# Patient Record
Sex: FEMALE | Race: WHITE | NOT HISPANIC OR LATINO | URBAN - METROPOLITAN AREA
[De-identification: names, ages, dates, MRNs, and addresses within clinical notes are randomized per-mention and may not be internally consistent; named-entity substitution may affect disease eponyms.]

---

## 2021-07-14 ENCOUNTER — OFFICE VISIT (OUTPATIENT)
Dept: URBAN - METROPOLITAN AREA CLINIC 19 | Facility: CLINIC | Age: 76
End: 2021-07-14

## 2022-02-14 ENCOUNTER — LAB OUTSIDE AN ENCOUNTER (OUTPATIENT)
Dept: URBAN - METROPOLITAN AREA CLINIC 19 | Facility: CLINIC | Age: 77
End: 2022-02-14

## 2022-02-14 ENCOUNTER — OFFICE VISIT (OUTPATIENT)
Dept: URBAN - METROPOLITAN AREA CLINIC 19 | Facility: CLINIC | Age: 77
End: 2022-02-14
Payer: MEDICARE

## 2022-02-14 ENCOUNTER — WEB ENCOUNTER (OUTPATIENT)
Dept: URBAN - METROPOLITAN AREA CLINIC 19 | Facility: CLINIC | Age: 77
End: 2022-02-14

## 2022-02-14 ENCOUNTER — DASHBOARD ENCOUNTERS (OUTPATIENT)
Age: 77
End: 2022-02-14

## 2022-02-14 VITALS
OXYGEN SATURATION: 99 % | TEMPERATURE: 98.3 F | SYSTOLIC BLOOD PRESSURE: 126 MMHG | HEART RATE: 81 BPM | BODY MASS INDEX: 23.11 KG/M2 | DIASTOLIC BLOOD PRESSURE: 80 MMHG | HEIGHT: 69 IN | WEIGHT: 156 LBS

## 2022-02-14 DIAGNOSIS — R13.10 DYSPHAGIA, UNSPECIFIED TYPE: ICD-10-CM

## 2022-02-14 DIAGNOSIS — K21.9 GASTROESOPHAGEAL REFLUX DISEASE, UNSPECIFIED WHETHER ESOPHAGITIS PRESENT: ICD-10-CM

## 2022-02-14 PROCEDURE — 99213 OFFICE O/P EST LOW 20 MIN: CPT | Performed by: NURSE PRACTITIONER

## 2022-02-14 NOTE — HPI-TODAY'S VISIT:
Ms. Penrose is a 76 year-old female who was last seen in GI clinic on 8/14/2019 by Dr. Madden for dysphagia. She reports her dysphagia returned a couple of months ago. Was taking omeprazole prn, now been on it every day for the last 6 weeks. Feels she has seen a slight improvement. Some reflux. No nausea. No abdominal pain. She denies cardiac/kidney/lung disease, diabetes, blood thinners, and home O2.   Prior history below:  Colonoscopy with Dr. Madden 7/30/2019- Preparation of colon was fair. One 5mm polyp (tubular adenoma). Diverticulosis in the sigmoid. Non-bleeding internal hemorrhoids.  EGD with Dr. Madden 7/30/2019- Nodular mucosa in the esophagus. Medium-sized hiatal hernia. Gastritis. Normal examined duodenum.

## 2022-04-05 PROBLEM — 235595009: Status: ACTIVE | Noted: 2022-02-14

## 2022-04-19 ENCOUNTER — TELEPHONE ENCOUNTER (OUTPATIENT)
Dept: URBAN - METROPOLITAN AREA CLINIC 92 | Facility: CLINIC | Age: 77
End: 2022-04-19

## 2022-04-19 ENCOUNTER — OFFICE VISIT (OUTPATIENT)
Dept: URBAN - METROPOLITAN AREA LAB 2 | Facility: LAB | Age: 77
End: 2022-04-19

## 2022-04-19 ENCOUNTER — CLAIMS CREATED FROM THE CLAIM WINDOW (OUTPATIENT)
Dept: URBAN - METROPOLITAN AREA LAB 2 | Facility: LAB | Age: 77
End: 2022-04-19

## 2022-04-19 ENCOUNTER — CLAIMS CREATED FROM THE CLAIM WINDOW (OUTPATIENT)
Dept: URBAN - METROPOLITAN AREA LAB 2 | Facility: LAB | Age: 77
End: 2022-04-19
Payer: MEDICARE

## 2022-04-19 DIAGNOSIS — K21.00 ALKALINE REFLUX ESOPHAGITIS: ICD-10-CM

## 2022-04-19 DIAGNOSIS — K29.60 ADENOPAPILLOMATOSIS GASTRICA: ICD-10-CM

## 2022-04-19 DIAGNOSIS — R13.10 ABNORMAL DEGLUTITION: ICD-10-CM

## 2022-04-19 PROCEDURE — 43239 EGD BIOPSY SINGLE/MULTIPLE: CPT | Performed by: INTERNAL MEDICINE

## 2022-04-28 ENCOUNTER — TELEPHONE ENCOUNTER (OUTPATIENT)
Dept: URBAN - METROPOLITAN AREA CLINIC 92 | Facility: CLINIC | Age: 77
End: 2022-04-28

## 2022-05-26 PROBLEM — 40739000: Status: ACTIVE | Noted: 2022-02-14

## 2022-06-03 ENCOUNTER — TELEPHONE ENCOUNTER (OUTPATIENT)
Dept: URBAN - METROPOLITAN AREA CLINIC 19 | Facility: CLINIC | Age: 77
End: 2022-06-03

## 2022-06-21 ENCOUNTER — OFFICE VISIT (OUTPATIENT)
Dept: URBAN - METROPOLITAN AREA LAB 2 | Facility: LAB | Age: 77
End: 2022-06-21

## 2022-06-28 ENCOUNTER — OFFICE VISIT (OUTPATIENT)
Dept: URBAN - METROPOLITAN AREA LAB 2 | Facility: LAB | Age: 77
End: 2022-06-28

## 2024-08-12 ENCOUNTER — LAB OUTSIDE AN ENCOUNTER (OUTPATIENT)
Dept: URBAN - METROPOLITAN AREA CLINIC 19 | Facility: CLINIC | Age: 79
End: 2024-08-12

## 2024-08-14 ENCOUNTER — OFFICE VISIT (OUTPATIENT)
Dept: URBAN - METROPOLITAN AREA CLINIC 19 | Facility: CLINIC | Age: 79
End: 2024-08-14
Payer: MEDICARE

## 2024-08-14 ENCOUNTER — LAB OUTSIDE AN ENCOUNTER (OUTPATIENT)
Dept: URBAN - METROPOLITAN AREA CLINIC 19 | Facility: CLINIC | Age: 79
End: 2024-08-14

## 2024-08-14 DIAGNOSIS — K22.10 ESOPHAGEAL ULCER: ICD-10-CM

## 2024-08-14 DIAGNOSIS — Z86.010 PERSONAL HISTORY OF COLONIC POLYPS: ICD-10-CM

## 2024-08-14 PROCEDURE — 99203 OFFICE O/P NEW LOW 30 MIN: CPT

## 2024-08-14 PROCEDURE — 99213 OFFICE O/P EST LOW 20 MIN: CPT

## 2024-08-14 RX ORDER — AMLODIPINE BESYLATE 5 MG/1
1 TABLET TABLET ORAL ONCE A DAY
Status: ACTIVE | COMMUNITY

## 2024-08-14 RX ORDER — LOSARTAN POTASSIUM 100 MG/1
1 TABLET TABLET, FILM COATED ORAL ONCE A DAY
Status: ACTIVE | COMMUNITY

## 2024-08-14 RX ORDER — GABAPENTIN 300 MG/1
1 CAPSULE CAPSULE ORAL ONCE A DAY
Status: ACTIVE | COMMUNITY

## 2024-08-14 NOTE — HPI-TODAY'S VISIT:
79-year-old female with PMH of HTN and HLD presents for a colonoscopy.  The patient has a personal history of colon polyps.  There is no family history of colon polyps or colon cancer.  Patient denies change in bowel habits, appetite, and weight. Patient denies rectal bleeding.  Denies heart, lungs and kidney issues. Takes omeprazole as needed. Denies heartburn or difficulty swallowing Will be having cervical spine surgery in the next few weeks. Has seen cardiologist who cleared her for surgery.  Previous GI workup: 4/19/2022 EGD by Dr. Madden-esophageal ulcers, medium size hiatal hernia, normal duodenum.  Recommended to repeat upper endoscopy in 2 months and take omeprazole 40 mg twice a day.  Path: Fragments of gastric mucosa with mild chronic inflammation and reactive changes.  Negative for H. pylori.  Lower third esophageal biopsy revealed squamous epithelium with features consistent with reflux esophagitis.  Negative for intestinal metaplasia, fungal elements or EOE. 7/30/2019 EGD by Dr. Madden-nodular mucosa in the esophagus, esophageal mucosal changes suspicious for Mercado's esophagus, medium size hiatal hernia, normal duodenum.  Path: Chronic inactive gastritis.  Negative for EOE or intestinal metaplasia. Colonoscopy-fair colon preparation, tubular adenoma polyp, diverticulosis, and nonbleeding internal hemorrhoids. 4/3/2012 colonoscopy-fair colon preparation, tortuous colon, moderate colonic spasm, exudate, internal hemorrhoids and diverticulosis.

## 2024-08-27 ENCOUNTER — TELEPHONE ENCOUNTER (OUTPATIENT)
Dept: URBAN - METROPOLITAN AREA CLINIC 19 | Facility: CLINIC | Age: 79
End: 2024-08-27

## 2024-08-28 ENCOUNTER — OFFICE VISIT (OUTPATIENT)
Dept: URBAN - METROPOLITAN AREA SURGERY CENTER 31 | Facility: SURGERY CENTER | Age: 79
End: 2024-08-28

## 2024-08-28 RX ORDER — AMLODIPINE BESYLATE 5 MG/1
1 TABLET TABLET ORAL ONCE A DAY
Status: ACTIVE | COMMUNITY

## 2024-08-28 RX ORDER — GABAPENTIN 300 MG/1
1 CAPSULE CAPSULE ORAL ONCE A DAY
Status: ACTIVE | COMMUNITY

## 2024-08-28 RX ORDER — LOSARTAN POTASSIUM 100 MG/1
1 TABLET TABLET, FILM COATED ORAL ONCE A DAY
Status: ACTIVE | COMMUNITY

## 2024-09-25 ENCOUNTER — OFFICE VISIT (OUTPATIENT)
Dept: URBAN - METROPOLITAN AREA CLINIC 19 | Facility: CLINIC | Age: 79
End: 2024-09-25
Payer: MEDICARE

## 2024-09-25 VITALS
OXYGEN SATURATION: 100 % | DIASTOLIC BLOOD PRESSURE: 80 MMHG | HEIGHT: 69 IN | SYSTOLIC BLOOD PRESSURE: 120 MMHG | HEART RATE: 83 BPM | TEMPERATURE: 98.4 F | BODY MASS INDEX: 21.18 KG/M2 | WEIGHT: 143 LBS

## 2024-09-25 DIAGNOSIS — K64.8 INTERNAL HEMORRHOID: ICD-10-CM

## 2024-09-25 DIAGNOSIS — K57.90 DIVERTICULOSIS: ICD-10-CM

## 2024-09-25 DIAGNOSIS — K22.10 EROSIVE ESOPHAGITIS: ICD-10-CM

## 2024-09-25 DIAGNOSIS — K44.9 HIATAL HERNIA: ICD-10-CM

## 2024-09-25 PROCEDURE — 99212 OFFICE O/P EST SF 10 MIN: CPT

## 2024-09-25 RX ORDER — AMLODIPINE BESYLATE 5 MG/1
1 TABLET TABLET ORAL ONCE A DAY
Status: ACTIVE | COMMUNITY

## 2024-09-25 RX ORDER — GABAPENTIN 300 MG/1
1 CAPSULE CAPSULE ORAL ONCE A DAY
Status: ACTIVE | COMMUNITY

## 2024-09-25 RX ORDER — LOSARTAN POTASSIUM 100 MG/1
1 TABLET TABLET, FILM COATED ORAL ONCE A DAY
Status: ACTIVE | COMMUNITY

## 2024-09-25 NOTE — HPI-TODAY'S VISIT:
Ms. Penrose is here for follow-up after EGD and colonoscopy. She is having BMs but feels constipated. Her neck surgery is scheduled in 3 weeks.  8/28/2024 EGD by Dr. Madden - GE junction erosive esophagitis, medium size hiatal hernia, erythematous mucosa in the antrum, normal duodenum.  Path: Mild chronic gastritis, negative for H. pylori or intestinal metaplasia.  GE junction biopsy consistent with reflux type changes.  No evidence of Mercado's, or EOE. Colonoscopy - Diverticulosis, internal hemorrhoids.  No specimens collected.  Recommended to repeat in 5 years.  Previous GI workup: 4/19/2022 EGD by Dr. Madden - Esophageal ulcers, medium size hiatal hernia, normal duodenum. Recommended to repeat upper endoscopy in 2 months and take omeprazole 40 mg twice a day. Path: Fragments of gastric mucosa with mild chronic inflammation and reactive changes. Negative for H. pylori. Lower third esophageal biopsy revealed squamous epithelium with features consistent with reflux esophagitis. Negative for intestinal metaplasia, fungal elements or EOE. 7/30/2019 EGD by Dr. Madden-nodular mucosa in the esophagus, esophageal mucosal changes suspicious for Mercado's esophagus, medium size hiatal hernia, normal duodenum. Path: Chronic inactive gastritis. Negative for EOE or intestinal metaplasia. Colonoscopy-fair colon preparation, tubular adenoma polyp, diverticulosis, and nonbleeding internal hemorrhoids. 4/3/2012 colonoscopy-fair colon preparation, tortuous colon, moderate colonic spasm, exudate, internal hemorrhoids and diverticulosis